# Patient Record
Sex: FEMALE | URBAN - METROPOLITAN AREA
[De-identification: names, ages, dates, MRNs, and addresses within clinical notes are randomized per-mention and may not be internally consistent; named-entity substitution may affect disease eponyms.]

---

## 2018-06-29 ENCOUNTER — OFFICE VISIT (OUTPATIENT)
Dept: URBAN - METROPOLITAN AREA CLINIC 17 | Facility: CLINIC | Age: 73
End: 2018-06-29
Payer: COMMERCIAL

## 2018-06-29 DIAGNOSIS — H43.813 VITREOUS DEGENERATION, BILATERAL: ICD-10-CM

## 2018-06-29 DIAGNOSIS — H02.89 OTHER SPECIFIED DISORDERS OF EYELID: ICD-10-CM

## 2018-06-29 DIAGNOSIS — H25.813 COMBINED FORMS OF AGE-RELATED CATARACT, BILATERAL: Primary | ICD-10-CM

## 2018-06-29 PROCEDURE — 99203 OFFICE O/P NEW LOW 30 MIN: CPT | Performed by: OPTOMETRIST

## 2018-06-29 ASSESSMENT — INTRAOCULAR PRESSURE
OS: 15
OD: 11

## 2018-06-29 NOTE — IMPRESSION/PLAN
Impression: Other specified disorders of eyelid: H02.89. OS.
small fluid-filled cyst/LLL Pt reports cyst has gotten larger Plan: Discussed diagnosis in detail with patient. Advised patient of condition. Consult recommended [[facility]]. Will refer to Dr. Landen Cao for lid eval/LLL.

## 2018-06-29 NOTE — IMPRESSION/PLAN
Impression: Vitreous degeneration, bilateral: H43.813. Plan: There is no evidence of retinal pathology. All signs and risks of retinal detachment and tears were discussed in detail. Patient instructed to call the office immediately if any symptoms noted. Recommend the patient return to office for follow up.

## 2018-07-26 ENCOUNTER — OFFICE VISIT (OUTPATIENT)
Dept: URBAN - METROPOLITAN AREA CLINIC 17 | Facility: CLINIC | Age: 73
End: 2018-07-26
Payer: COMMERCIAL

## 2018-07-26 PROCEDURE — 92012 INTRM OPH EXAM EST PATIENT: CPT | Performed by: OPHTHALMOLOGY

## 2018-07-26 ASSESSMENT — INTRAOCULAR PRESSURE
OD: 12
OS: 11

## 2018-07-26 NOTE — IMPRESSION/PLAN
Impression: Cysts of left lower eyelid: H02.825. OS. Condition: established, stable. Symptoms: may improve with surgery. Vision: vision threatening. Plan: Discussed diagnosis in detail with patient. Discussed treatment options with patient. Surgical treatment is required. Surgical risks and benefits were discussed, explained and understood by patient. Patient elects to have surgery. Recommend lid lesion excision of left lower eyelid. Obtain insurance verification and schedule minor office procedure.

## 2018-07-31 ENCOUNTER — OFFICE VISIT (OUTPATIENT)
Dept: URBAN - METROPOLITAN AREA CLINIC 17 | Facility: CLINIC | Age: 73
End: 2018-07-31
Payer: COMMERCIAL

## 2018-07-31 DIAGNOSIS — H52.4 PRESBYOPIA: Primary | ICD-10-CM

## 2018-07-31 PROCEDURE — 92015 DETERMINE REFRACTIVE STATE: CPT | Performed by: OPTOMETRIST

## 2018-07-31 PROCEDURE — 92012 INTRM OPH EXAM EST PATIENT: CPT | Performed by: OPTOMETRIST

## 2018-07-31 ASSESSMENT — VISUAL ACUITY
OD: 20/25-
OS: 20/25

## 2018-07-31 ASSESSMENT — INTRAOCULAR PRESSURE
OS: 14
OD: 15

## 2018-07-31 ASSESSMENT — KERATOMETRY
OD: 44.25
OS: 44.50

## 2018-08-09 ENCOUNTER — PROCEDURE (OUTPATIENT)
Dept: URBAN - METROPOLITAN AREA CLINIC 17 | Facility: CLINIC | Age: 73
End: 2018-08-09
Payer: COMMERCIAL

## 2018-08-09 DIAGNOSIS — H02.825 CYSTS OF LEFT LOWER EYELID: Primary | ICD-10-CM

## 2018-08-09 PROCEDURE — 67840 REMOVE EYELID LESION: CPT | Performed by: OPHTHALMOLOGY

## 2022-04-13 ENCOUNTER — OFFICE VISIT (OUTPATIENT)
Dept: URBAN - METROPOLITAN AREA CLINIC 23 | Facility: CLINIC | Age: 77
End: 2022-04-13
Payer: OTHER MISCELLANEOUS

## 2022-04-13 DIAGNOSIS — H40.033 ANATOMICAL NARROW ANGLE, BILATERAL: ICD-10-CM

## 2022-04-13 DIAGNOSIS — H25.813 COMBINED FORMS OF AGE-RELATED CATARACT, BILATERAL: Primary | ICD-10-CM

## 2022-04-13 PROCEDURE — 99204 OFFICE O/P NEW MOD 45 MIN: CPT | Performed by: OPTOMETRIST

## 2022-04-13 NOTE — IMPRESSION/PLAN
Impression: Combined forms of age-related cataract, bilateral: H25.813 Bilateral. Condition: established, worsening. Symptoms: could improve with surgery. Vision: vision affected. Plan: Cataracts account for the patient's complaints. Patient understands changing glasses will not improve vision. Patient desires to have surgery, recommend surgical consult. Briefly discussed advanced technology options.

## 2022-04-13 NOTE — IMPRESSION/PLAN
Impression: Anatomical narrow angle, bilateral: H40.033 Bilateral. Condition: will continue to monitor. Plan: Discussed findings. Narrow angles OU, recommend LPI consult at same time as cataract consult. Discussed signs and symptoms of angle closure, patient to call immediately with any noted symptoms.

## 2022-05-19 ENCOUNTER — OFFICE VISIT (OUTPATIENT)
Dept: URBAN - METROPOLITAN AREA CLINIC 24 | Facility: CLINIC | Age: 77
End: 2022-05-19
Payer: OTHER MISCELLANEOUS

## 2022-05-19 DIAGNOSIS — H25.13 AGE-RELATED NUCLEAR CATARACT, BILATERAL: ICD-10-CM

## 2022-05-19 DIAGNOSIS — H40.033 ANATOMICAL NARROW ANGLE, BILATERAL: Primary | ICD-10-CM

## 2022-05-19 PROCEDURE — 99204 OFFICE O/P NEW MOD 45 MIN: CPT | Performed by: OPHTHALMOLOGY

## 2022-05-19 PROCEDURE — 76514 ECHO EXAM OF EYE THICKNESS: CPT | Performed by: OPHTHALMOLOGY

## 2022-05-19 PROCEDURE — 92020 GONIOSCOPY: CPT | Performed by: OPHTHALMOLOGY

## 2022-05-19 RX ORDER — PREDNISOLONE ACETATE 10 MG/ML
1 % SUSPENSION/ DROPS OPHTHALMIC
Qty: 5 | Refills: 1 | Status: ACTIVE
Start: 2022-05-19

## 2022-05-19 ASSESSMENT — INTRAOCULAR PRESSURE
OD: 13
OS: 11

## 2022-05-19 NOTE — IMPRESSION/PLAN
Impression: Age-related nuclear cataract, bilateral: H25.13. Plan: Cataracts account for the patient's complaints. Discussed all risks, benefits, procedures and recovery. Patient understands changing glasses will not improve vision. Patient desires to have surgery, recommend phacoemulsification with intraocular lens. 


***Schedule Dilated Pre up after LPI AND A-Scan

## 2022-05-19 NOTE — IMPRESSION/PLAN
Impression: Anatomical narrow angle, bilateral: H40.033 Bilateral. Condition: will continue to monitor. Plan: Pt has NAG  Risk  Gonio : SANDY   Pachs: 576/589  Today's IOP  :13/11    Tmax : 15/15 Pt denies Family hx of Glaucoma Left eye is the better seeing eye VF OD superior nasal loss C/D: 0.35x0.35/0.4x0.4
OCT: 96/92 5/19/22 Pt denies Sulfa Allergy // Pt denies Lung /Heart dx Plan :
1. Recommend LPI; Discussed Risks and benefits of LPI. The patient was warned of signs and symptoms of angle closure glaucoma and the need for immediate follow-up. Discussed risks/benefits of laser peripheral iridotomy treatment. There is a possibility of need for additional sessions to complete LPI Discussed LPI does not lower pressure nor does it improve vision. If patient is on eye pressure reducing medication now, patient will still need to use them after LPI. A Ghost image or line in field of vision may be more evident in the bright light conditions. This usually resolves overtime. Also discussed possible need for further tx. Will rx Pred TID for 7 days then discontinue. 2. Pt agrees with plan and wishes to move forward 3. Schedule LPI OU Same day

## 2022-08-12 ENCOUNTER — TESTING ONLY (OUTPATIENT)
Dept: URBAN - METROPOLITAN AREA CLINIC 24 | Facility: CLINIC | Age: 77
End: 2022-08-12
Payer: OTHER MISCELLANEOUS

## 2022-08-12 DIAGNOSIS — H40.033 ANATOMICAL NARROW ANGLE, BILATERAL: Primary | ICD-10-CM

## 2022-08-12 DIAGNOSIS — H25.13 AGE-RELATED NUCLEAR CATARACT, BILATERAL: Primary | ICD-10-CM

## 2022-08-12 PROCEDURE — 99214 OFFICE O/P EST MOD 30 MIN: CPT | Performed by: OPHTHALMOLOGY

## 2022-08-12 PROCEDURE — 92136 OPHTHALMIC BIOMETRY: CPT | Performed by: OPHTHALMOLOGY

## 2022-08-12 ASSESSMENT — PACHYMETRY
OS: 2.64
OD: 23.23
OD: 2.59
OS: 23.31

## 2022-08-12 ASSESSMENT — INTRAOCULAR PRESSURE
OS: 14
OD: 19
OS: 14
OD: 19

## 2022-08-12 ASSESSMENT — VISUAL ACUITY
OS: 20/30
OD: 20/40

## 2022-08-12 NOTE — IMPRESSION/PLAN
Impression: Anatomical narrow angle, bilateral: H40.033 Bilateral. Condition: will continue to monitor. Plan: Pt has NAG  Risk  Gonio : SANDY   Pachs: 576/589  Today's IOP  :19/14    Tmax : 15/15 Pt denies Family hx of Glaucoma Left eye is the better seeing eye VF OD superior nasal loss C/D: 0.35x0.35/0.4x0.4
OCT: 96/92 5/19/22 Pt denies Sulfa Allergy // Pt denies Lung /Heart dx Plan :
1. Patent pi OU. Pt is safe for dilation.

## 2022-08-12 NOTE — IMPRESSION/PLAN
Impression: Age-related nuclear cataract, bilateral: H25.13. Plan: (( (( First eye OD Second eye OS AIM distance OU:  DEXYCU , Block, )) - 10-15 Min Discussed cataract diagnosis with the patient. Appropriate testing ordered for cataract diagnosis prior to Preop. Risks and benefits of surgical treatment were discussed and understood. Patient desires surgical treatment. Discussed lens options with pt and pt is ok with wearing glasses after surgery. Patient desires surgery to proceed with surgery. Both eyes examined, medically necessary due to impact in activities of daily living. Discussed higher risks with smaller pupil and discussed iris stretch and higher risks of bleeding.  Discussed there is a chance of developing capsular haze after surgery, which may be corrected with laser/yag

## 2022-10-25 ENCOUNTER — ADULT PHYSICAL (OUTPATIENT)
Dept: URBAN - METROPOLITAN AREA CLINIC 24 | Facility: CLINIC | Age: 77
End: 2022-10-25
Payer: OTHER MISCELLANEOUS

## 2022-10-25 DIAGNOSIS — H25.13 AGE-RELATED NUCLEAR CATARACT, BILATERAL: ICD-10-CM

## 2022-10-25 DIAGNOSIS — Z01.818 ENCOUNTER FOR OTHER PREPROCEDURAL EXAMINATION: Primary | ICD-10-CM

## 2022-10-25 PROCEDURE — 99202 OFFICE O/P NEW SF 15 MIN: CPT | Performed by: PHYSICIAN ASSISTANT

## 2022-11-03 ENCOUNTER — SURGERY (OUTPATIENT)
Dept: URBAN - METROPOLITAN AREA SURGERY 12 | Facility: SURGERY | Age: 77
End: 2022-11-03
Payer: OTHER MISCELLANEOUS

## 2022-11-03 DIAGNOSIS — H25.13 AGE-RELATED NUCLEAR CATARACT, BILATERAL: Primary | ICD-10-CM

## 2022-11-03 PROCEDURE — 66984 XCAPSL CTRC RMVL W/O ECP: CPT | Performed by: OPHTHALMOLOGY

## 2022-11-04 ENCOUNTER — POST-OPERATIVE VISIT (OUTPATIENT)
Dept: URBAN - METROPOLITAN AREA CLINIC 24 | Facility: CLINIC | Age: 77
End: 2022-11-04
Payer: OTHER MISCELLANEOUS

## 2022-11-04 DIAGNOSIS — Z48.810 ENCOUNTER FOR SURGICAL AFTERCARE FOLLOWING SURGERY ON A SENSE ORGAN: Primary | ICD-10-CM

## 2022-11-04 PROCEDURE — 99024 POSTOP FOLLOW-UP VISIT: CPT | Performed by: OPTOMETRIST

## 2022-11-04 ASSESSMENT — INTRAOCULAR PRESSURE: OD: 13

## 2022-11-04 NOTE — IMPRESSION/PLAN
Impression: S/P Cataract Extraction/IOL (BDP) OD - 1 Day. Encounter for surgical aftercare following surgery on a sense organ  Z48.810. Plan: PO instructions reviewed.

## 2022-11-10 ENCOUNTER — POST-OPERATIVE VISIT (OUTPATIENT)
Dept: URBAN - METROPOLITAN AREA CLINIC 24 | Facility: CLINIC | Age: 77
End: 2022-11-10
Payer: OTHER MISCELLANEOUS

## 2022-11-10 DIAGNOSIS — Z48.810 ENCOUNTER FOR SURGICAL AFTERCARE FOLLOWING SURGERY ON A SENSE ORGAN: Primary | ICD-10-CM

## 2022-11-10 PROCEDURE — 99024 POSTOP FOLLOW-UP VISIT: CPT | Performed by: STUDENT IN AN ORGANIZED HEALTH CARE EDUCATION/TRAINING PROGRAM

## 2022-11-10 ASSESSMENT — VISUAL ACUITY
OS: 20/30
OD: 20/25

## 2022-11-10 ASSESSMENT — INTRAOCULAR PRESSURE
OD: 14
OS: 14

## 2022-11-10 NOTE — IMPRESSION/PLAN
Impression: S/P Cataract Extraction/IOL (BDP) OD - 7 Days. Encounter for surgical aftercare following surgery on a sense organ  Z48.810. Plan: Reassured pt that line is just trimoxi, will resolve and break up with time.

## 2022-11-18 ENCOUNTER — POST-OPERATIVE VISIT (OUTPATIENT)
Dept: URBAN - METROPOLITAN AREA CLINIC 24 | Facility: CLINIC | Age: 77
End: 2022-11-18
Payer: OTHER MISCELLANEOUS

## 2022-11-18 DIAGNOSIS — Z48.810 ENCOUNTER FOR SURGICAL AFTERCARE FOLLOWING SURGERY ON A SENSE ORGAN: Primary | ICD-10-CM

## 2022-11-18 PROCEDURE — 99024 POSTOP FOLLOW-UP VISIT: CPT | Performed by: STUDENT IN AN ORGANIZED HEALTH CARE EDUCATION/TRAINING PROGRAM

## 2022-11-18 ASSESSMENT — INTRAOCULAR PRESSURE: OS: 15

## 2022-11-18 NOTE — IMPRESSION/PLAN
Impression: S/P Cataract Extraction by phacoemulsification with IOL placement OS - 1 Day. Encounter for surgical aftercare following surgery on a sense organ  Z48.810.  Plan: Cont AT qid prn, RTC as scheduled for final po

## 2022-12-16 ENCOUNTER — POST-OPERATIVE VISIT (OUTPATIENT)
Dept: URBAN - METROPOLITAN AREA CLINIC 24 | Facility: CLINIC | Age: 77
End: 2022-12-16
Payer: OTHER MISCELLANEOUS

## 2022-12-16 DIAGNOSIS — Z48.810 ENCOUNTER FOR SURGICAL AFTERCARE FOLLOWING SURGERY ON A SENSE ORGAN: ICD-10-CM

## 2022-12-16 DIAGNOSIS — H52.4 PRESBYOPIA: Primary | ICD-10-CM

## 2022-12-16 PROCEDURE — 99024 POSTOP FOLLOW-UP VISIT: CPT | Performed by: OPTOMETRIST

## 2022-12-16 ASSESSMENT — VISUAL ACUITY
OD: 20/20
OS: 20/20

## 2022-12-16 ASSESSMENT — INTRAOCULAR PRESSURE
OS: 11
OD: 13

## 2022-12-16 NOTE — IMPRESSION/PLAN
Impression:  Encounter for surgical aftercare following surgery on a sense organ  Z48.810. Plan: Reassured pt. Reviewed RD si/sx. Continue ATs QID+. Defers srx at this time.